# Patient Record
Sex: MALE | NOT HISPANIC OR LATINO | URBAN - METROPOLITAN AREA
[De-identification: names, ages, dates, MRNs, and addresses within clinical notes are randomized per-mention and may not be internally consistent; named-entity substitution may affect disease eponyms.]

---

## 2017-03-10 ASSESSMENT — MIFFLIN-ST. JEOR: SCORE: 1761.33

## 2017-03-14 ENCOUNTER — ANESTHESIA - HEALTHEAST (OUTPATIENT)
Dept: SURGERY | Facility: HOSPITAL | Age: 77
End: 2017-03-14

## 2017-03-14 ENCOUNTER — SURGERY - HEALTHEAST (OUTPATIENT)
Dept: SURGERY | Facility: HOSPITAL | Age: 77
End: 2017-03-14

## 2017-06-12 ENCOUNTER — ANESTHESIA - HEALTHEAST (OUTPATIENT)
Dept: SURGERY | Facility: HOSPITAL | Age: 77
End: 2017-06-12

## 2017-06-12 ASSESSMENT — MIFFLIN-ST. JEOR: SCORE: 1777.21

## 2017-06-13 ENCOUNTER — SURGERY - HEALTHEAST (OUTPATIENT)
Dept: SURGERY | Facility: HOSPITAL | Age: 77
End: 2017-06-13

## 2017-10-06 ENCOUNTER — ANESTHESIA - HEALTHEAST (OUTPATIENT)
Dept: SURGERY | Facility: HOSPITAL | Age: 77
End: 2017-10-06

## 2017-10-06 ASSESSMENT — MIFFLIN-ST. JEOR: SCORE: 1784.01

## 2017-10-09 ENCOUNTER — SURGERY - HEALTHEAST (OUTPATIENT)
Dept: SURGERY | Facility: HOSPITAL | Age: 77
End: 2017-10-09

## 2021-05-26 ENCOUNTER — RECORDS - HEALTHEAST (OUTPATIENT)
Dept: ADMINISTRATIVE | Facility: CLINIC | Age: 81
End: 2021-05-26

## 2021-05-29 ENCOUNTER — RECORDS - HEALTHEAST (OUTPATIENT)
Dept: ADMINISTRATIVE | Facility: CLINIC | Age: 81
End: 2021-05-29

## 2021-05-30 VITALS — HEIGHT: 69 IN | WEIGHT: 235 LBS | BODY MASS INDEX: 34.8 KG/M2

## 2021-05-31 VITALS — HEIGHT: 69 IN | BODY MASS INDEX: 35.32 KG/M2 | WEIGHT: 238.5 LBS

## 2021-05-31 VITALS — WEIGHT: 240 LBS | HEIGHT: 69 IN | BODY MASS INDEX: 35.55 KG/M2

## 2021-06-02 ENCOUNTER — RECORDS - HEALTHEAST (OUTPATIENT)
Dept: ADMINISTRATIVE | Facility: CLINIC | Age: 81
End: 2021-06-02

## 2021-06-09 NOTE — ANESTHESIA CARE TRANSFER NOTE
Last vitals:   Vitals:    03/14/17 1023   BP: 141/69   Pulse: 63   Resp: 16   Temp: 36.7  C (98  F)   SpO2: 100%     Patient's level of consciousness is drowsy  Spontaneous respirations: yes  Maintains airway independently: yes  Dentition unchanged: yes  Oropharynx: oropharynx clear of all foreign objects    QCDR Measures:  ASA# 20 - Surgical Safety Checklist: ASA20A - Safety Checks Done  PQRS# 430 - Adult PONV Prevention: 4558F - Pt received => 2 anti-emetic agents (different classes) preop & intraop  ASA# 8 - Peds PONV Prevention: NA - Not pediatric patient, not GA or 2 or more risk factors NOT present  PQRS# 424 - Dior-op Temp Management: 4559F - At least one body temp DOCUMENTED => 35.5C or 95.9F within required timeframe  PQRS# 426 - PACU Transfer Protocol: - Transfer of care checklist used  ASA# 14 - Acute Post-op Pain: ASA14B - Patient did NOT experience pain >= 7 out of 10    I completed my SBAR handoff to the receiving nurse per policy and procedure.

## 2021-06-09 NOTE — ANESTHESIA POSTPROCEDURE EVALUATION
Patient: Rick Arreaga  CYSTOSCOPY,  URETHRAL DILATION OF BLADDER NECK, TRANSURETHRAL RESECTION PROSTATE CANCER, RIGHT RETROGRADE, RIGHT URETERAL STENT EXCHANGE  Anesthesia type: general    Patient location: Phase II Recovery  Last vitals:   Vitals:    03/14/17 1400   BP: (!) 183/79   Pulse: (!) 59   Resp: 18   Temp:    SpO2: 96%     Post vital signs: stable  Level of consciousness: awake and responds to simple questions  Post-anesthesia pain: pain controlled  Post-anesthesia nausea and vomiting: no  Pulmonary: unassisted, return to baseline  Cardiovascular: stable and blood pressure at baseline  Hydration: adequate  Anesthetic events: no    QCDR Measures:  ASA# 11 - Dior-op Cardiac Arrest: ASA11B - Patient did NOT experience unanticipated cardiac arrest  ASA# 12 - Dior-op Mortality Rate: ASA12B - Patient did NOT die  ASA# 13 - PACU Re-Intubation Rate: ASA13B - Patient did NOT require a new airway mgmt  ASA# 10 - Composite Anes Safety: ASA10A - No serious adverse event  ASA# 38 - New Corneal Injury: ASA38A - No new exposure keratitis or corneal abrasion in PACU    Additional Notes: Delayed entry. Patient dischraged without complications

## 2021-06-09 NOTE — ANESTHESIA PREPROCEDURE EVALUATION
Anesthesia Evaluation      Patient summary reviewed   No history of anesthetic complications     Airway   Mallampati: II   Pulmonary - normal exam   (+) sleep apnea on CPAP, ,                          Cardiovascular   Exercise tolerance: > or = 4 METS (Gets winded with 2 flights)  (+) hypertension well controlled, CAD (stent 2000.. 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

## 2021-06-11 NOTE — ANESTHESIA CARE TRANSFER NOTE
Last vitals:   Vitals:    06/13/17 0953   BP: (!) 193/88   Pulse: 82   Resp: 10   Temp: 36.3  C (97.3  F)   SpO2: 97%     Patient's level of consciousness is drowsy  Spontaneous respirations: yes  Maintains airway independently: yes  Dentition unchanged: yes  Oropharynx: oropharynx clear of all foreign objects    QCDR Measures:  ASA# 20 - Surgical Safety Checklist: ASA20A - Safety Checks Done  PQRS# 430 - Adult PONV Prevention: 4558F - Pt received => 2 anti-emetic agents (different classes) preop & intraop  ASA# 8 - Peds PONV Prevention: NA - Not pediatric patient, not GA or 2 or more risk factors NOT present  PQRS# 424 - Dior-op Temp Management: 4559F - At least one body temp DOCUMENTED => 35.5C or 95.9F within required timeframe  PQRS# 426 - PACU Transfer Protocol: - Transfer of care checklist used  ASA# 14 - Acute Post-op Pain: ASA14B - Patient did NOT experience pain >= 7 out of 10

## 2021-06-11 NOTE — ANESTHESIA POSTPROCEDURE EVALUATION
Patient: Rick Arreaga  CYSTOSCOPY, RIGHT URETERAL STENT EXCHANGE  Anesthesia type: general    Patient location: PACU  Last vitals:   Vitals:    06/13/17 1252   BP: 177/82   Pulse:    Resp: 16   Temp:    SpO2:      Post vital signs: stable  Level of consciousness: awake and responds to simple questions  Post-anesthesia pain: pain controlled  Post-anesthesia nausea and vomiting: no  Pulmonary: unassisted, return to baseline  Cardiovascular: stable and blood pressure at baseline  Hydration: adequate  Anesthetic events: no    QCDR Measures:  ASA# 11 - Dior-op Cardiac Arrest: ASA11B - Patient did NOT experience unanticipated cardiac arrest  ASA# 12 - Dior-op Mortality Rate: ASA12B - Patient did NOT die  ASA# 13 - PACU Re-Intubation Rate: ASA13B - Patient did NOT require a new airway mgmt  ASA# 10 - Composite Anes Safety: ASA10A - No serious adverse event  ASA# 38 - New Corneal Injury: ASA38A - No new exposure keratitis or corneal abrasion in PACU    Additional Notes: Patient was instructed to use his CPAP whenever sleeping at home post-operatively. He stated he would use his CPAP.    Conor Meehan MD

## 2021-06-11 NOTE — ANESTHESIA PREPROCEDURE EVALUATION
Anesthesia Evaluation      Patient summary reviewed   History of anesthetic complications (PONV)     Airway   Mallampati: III  Neck ROM: full   Pulmonary - normal exam   (+) sleep apnea on CPAP, , a smoker (Cigar smoker)                         Cardiovascular - normal exam  Exercise tolerance: > or = 4 METS  (+) hypertension, past MI (s/p inferior MI), CAD, CABG/stent (s/p cardiac stent placement 2000), dysrhythmias (PVC's), , hypercholesterolemia,   Received beta blockers in last 24 hours prior to incision.  ,  (-) angina  ECG reviewed        Neuro/Psych    (+) depression,   (-) no seizures, no CVA    Comments: Hearing aid right ear    Endo/Other    (+) hypothyroidism, obesity (Morbid obesity - BMI 50),   (-) no diabetes     Comments: Hypoparathyroidism  Hx metastatic melanoma    GI/Hepatic/Renal    (+)   chronic renal disease (Hx kidney stones), impaired hepatic function (Fatty liver dz)  (-) GERD    Comments: Metastatic prostate Ca  Bladder Ca     Other findings: Hgb 14.4, Plts 291K, K 4.3, Cr 0.93, TSH 0.95, Free T4 1.2      Dental                         Anesthesia Plan  Planned anesthetic: general endotracheal  Glidescope intubation  Sux for intubation  4% Xylocaine LTA  Decadron 10 mg IV  Zofran 4 mg IV  ASA 3   Induction: intravenous   Anesthetic plan and risks discussed with: patient and child/children  Anesthesia plan special considerations: antiemetics,   Post-op plan: routine recovery

## 2021-06-13 NOTE — ANESTHESIA POSTPROCEDURE EVALUATION
Patient: Rick Arreaga  CYSTOSCOPY, RIGHT DOUBLE J STENT EXCHANGE  Anesthesia type: general    Patient location: PACU  Last vitals:   Vitals:    10/09/17 1145   BP: 162/80   Pulse:    Resp:    Temp:    SpO2:      Post vital signs: stable  Level of consciousness: awake and responds to simple questions  Post-anesthesia pain: pain controlled  Post-anesthesia nausea and vomiting: no  Pulmonary: unassisted, return to baseline  Cardiovascular: stable and blood pressure at baseline  Hydration: adequate  Anesthetic events: no    QCDR Measures:  ASA# 11 - Dior-op Cardiac Arrest: ASA11B - Patient did NOT experience unanticipated cardiac arrest  ASA# 12 - Dior-op Mortality Rate: ASA12B - Patient did NOT die  ASA# 13 - PACU Re-Intubation Rate: ASA13B - Patient did NOT require a new airway mgmt  ASA# 10 - Composite Anes Safety: ASA10A - No serious adverse event       Additional Notes:

## 2021-06-13 NOTE — ANESTHESIA PREPROCEDURE EVALUATION
Anesthesia Evaluation      Patient summary reviewed     Airway   Mallampati: III  Neck ROM: full   Pulmonary - normal exam   (+) sleep apnea on CPAP, moderate, a smoker                         Cardiovascular - normal exam  (+) hypertension well controlled, CAD, ,   Received beta blockers in last 24 hours prior to incision.  ,   Neuro/Psych - negative ROS     Endo/Other - negative ROS      GI/Hepatic/Renal    (+)   chronic renal disease CRI,   Hiatal hernia: prostate surgery,Has hydronephrosis.          Dental                         Anesthesia Plan  Planned anesthetic: general endotracheal  Glidescope  ASA 3   Induction: intravenous   Anesthetic plan and risks discussed with: patient    Post-op plan: routine recovery

## 2024-07-09 NOTE — ANESTHESIA CARE TRANSFER NOTE
Last vitals:   Vitals:    10/09/17 0950   BP: (!) 185/89   Pulse: 77   Resp: 16   Temp: 36.4  C (97.5  F)   SpO2: 98%     Patient's level of consciousness is drowsy  Spontaneous respirations: yes  Maintains airway independently: yes  Dentition unchanged: yes  Oropharynx: oropharynx clear of all foreign objects    QCDR Measures:  ASA# 20 - Surgical Safety Checklist: WHO surgical safety checklist completed prior to induction  PQRS# 430 - Adult PONV Prevention: 4558F - Pt received => 2 anti-emetic agents (different classes) preop & intraop  ASA# 8 - Peds PONV Prevention: NA - Not pediatric patient, not GA or 2 or more risk factors NOT present  PQRS# 424 - Dior-op Temp Management: 4559F - At least one body temp DOCUMENTED => 35.5C or 95.9F within required timeframe  PQRS# 426 - PACU Transfer Protocol: - Transfer of care checklist used  ASA# 14 - Acute Post-op Pain: ASA14B - Patient did NOT experience pain >= 7 out of 10   What Type Of Note Output Would You Prefer (Optional)?: Standard Output How Severe Is Your Skin Lesion?: moderate Is This A New Presentation, Or A Follow-Up?: Skin Lesion Additional History: Saw urgent care they did an I&D and is taking doxycycline, states area is resolving